# Patient Record
Sex: MALE
[De-identification: names, ages, dates, MRNs, and addresses within clinical notes are randomized per-mention and may not be internally consistent; named-entity substitution may affect disease eponyms.]

---

## 2019-03-05 ENCOUNTER — HOSPITAL ENCOUNTER (OUTPATIENT)
Dept: HOSPITAL 31 - C.SDS | Age: 34
Discharge: HOME | End: 2019-03-05
Attending: INTERNAL MEDICINE
Payer: COMMERCIAL

## 2019-03-05 VITALS
OXYGEN SATURATION: 97 % | DIASTOLIC BLOOD PRESSURE: 77 MMHG | RESPIRATION RATE: 20 BRPM | TEMPERATURE: 98 F | SYSTOLIC BLOOD PRESSURE: 111 MMHG | HEART RATE: 92 BPM

## 2019-03-05 VITALS — BODY MASS INDEX: 24.3 KG/M2

## 2019-03-05 DIAGNOSIS — D86.9: Primary | ICD-10-CM

## 2019-03-05 LAB
APTT BLD: 32 {NULL, SECONDS} (ref 21–34)
BASOPHILS # BLD AUTO: 0 {NULL, K/UL} (ref 0–0.2)
BASOPHILS NFR BLD: 0.8 {NULL, %} (ref 0–2)
BUN SERPL-MCNC: 11 {NULL, MG/DL} (ref 9–20)
CALCIUM SERPL-MCNC: 9.3 {NULL, MG/DL} (ref 8.6–10.4)
EOSINOPHIL # BLD AUTO: 0.2 {NULL, K/UL} (ref 0–0.7)
EOSINOPHIL NFR BLD: 4.3 {NULL, %} (ref 0–4)
ERYTHROCYTE [DISTWIDTH] IN BLOOD BY AUTOMATED COUNT: 12.9 {NULL, %} (ref 11.5–14.5)
GFR NON-AFRICAN AMERICAN: > 60 {NULL, NULL}
HGB BLD-MCNC: 14.5 {NULL, G/DL} (ref 12–18)
INR PPP: 1.2 {NULL, NULL}
LYMPHOCYTES # BLD AUTO: 1.1 {NULL, K/UL} (ref 1–4.3)
LYMPHOCYTES NFR BLD AUTO: 31.3 {NULL, %} (ref 20–40)
MCH RBC QN AUTO: 30.6 {NULL, PG} (ref 27–31)
MCHC RBC AUTO-ENTMCNC: 32.7 {NULL, G/DL} (ref 33–37)
MCV RBC AUTO: 93.4 {NULL, FL} (ref 80–94)
MONOCYTES # BLD: 0.6 {NULL, K/UL} (ref 0–0.8)
MONOCYTES NFR BLD: 15.7 {NULL, %} (ref 0–10)
NEUTROPHILS # BLD: 1.7 {NULL, K/UL} (ref 1.8–7)
NEUTROPHILS NFR BLD AUTO: 47.9 {NULL, %} (ref 50–75)
NRBC BLD AUTO-RTO: 0 {NULL, %} (ref 0–2)
PLATELET # BLD: 425 {NULL, K/UL} (ref 130–400)
PMV BLD AUTO: 8.2 {NULL, FL} (ref 7.2–11.7)
PROTHROMBIN TIME: 12.8 {NULL, SECONDS} (ref 9.7–12.2)
RBC # BLD AUTO: 4.75 {NULL, MIL/UL} (ref 4.4–5.9)
WBC # BLD AUTO: 3.6 {NULL, K/UL} (ref 4.8–10.8)

## 2019-03-06 NOTE — OP
PROCEDURE DATE:  03/05/2019



PROCEDURE:  Bronchoscopy with biopsy.



INDICATION:  Bilateral mediastinal hilar adenopathy.



A scope was passed through endotracheal tube.  Transbronchial biopsy done

on the right lower lobe.  Endobronchial biopsy done on the right upper

lobe.  Of note, the patient was supposed to have an EBUS; however, the ____

was not functioning, so the EBUS was not performed.







__________________________________________

Jessica Haq MD





DD:  03/05/2019 13:26:05

DT:  03/05/2019 19:09:14

Job # [id number]

## 2019-03-07 NOTE — CARD
--------------- APPROVED REPORT --------------





Date of service: 03/05/2019



EKG Measurement

Heart [id number]

[id number]

[id number]

[id number]

[id number]



<Conclusion>

Normal sinus rhythm

Moderate voltage criteria for LVH, may be normal variant

Borderline ECG

## 2024-08-12 NOTE — RAD
Date of service: 



03/05/2019



HISTORY:

Status post bronchoscopy with biopsy



COMPARISON:

No prior study available for comparison. 



FINDINGS:



LUNGS:

Mild atelectasis and/or infiltrate changes in the right lung base 

less of the left lower lobe



PLEURA:

No significant pleural effusion identified, no pneumothorax apparent.



CARDIOVASCULAR:

No aortic atherosclerotic calcification present.



Heart size within range of normal.  Right paratracheal opacity may 

represent mediastinal adenopathy.  There is also enlargement of the 

hilar regions consistent with adenopathy.  Rule out sarcoidosis. 



OSSEOUS STRUCTURES:

No significant abnormalities.



VISUALIZED UPPER ABDOMEN:

Normal.



OTHER FINDINGS:

None.



IMPRESSION:

Bilateral hilar adenopathy; rule out sarcoidosis.  Questionable right 

paratracheal adenopathy. 



Mild atelectasis and/or infiltrate changes in the right lung base 

less of the left lower lobe. No evidence of pneumothorax
Date of service: 



03/05/2019



PROCEDURE:  Intraoperative Fluoroscopy. 



HISTORY:

SARCOIDOSIS



FINDINGS:

Fluoroscopic assistance was provided.  



Fluoroscopy time = 21.7 sec.  Radiation dose = 0.66622 mGy-cm. 



Please refer to the operative for additional details.
SEVERE